# Patient Record
Sex: MALE | Race: OTHER | HISPANIC OR LATINO | URBAN - METROPOLITAN AREA
[De-identification: names, ages, dates, MRNs, and addresses within clinical notes are randomized per-mention and may not be internally consistent; named-entity substitution may affect disease eponyms.]

---

## 2017-11-03 VITALS
DIASTOLIC BLOOD PRESSURE: 79 MMHG | TEMPERATURE: 98 F | OXYGEN SATURATION: 100 % | SYSTOLIC BLOOD PRESSURE: 137 MMHG | WEIGHT: 199.96 LBS | HEART RATE: 123 BPM | RESPIRATION RATE: 20 BRPM

## 2017-11-03 LAB
ALBUMIN SERPL ELPH-MCNC: 4.4 G/DL — SIGNIFICANT CHANGE UP (ref 3.3–5)
ALP SERPL-CCNC: 68 U/L — SIGNIFICANT CHANGE UP (ref 40–120)
ALT FLD-CCNC: 44 U/L — SIGNIFICANT CHANGE UP (ref 10–45)
ANION GAP SERPL CALC-SCNC: 19 MMOL/L — HIGH (ref 5–17)
ANION GAP SERPL CALC-SCNC: 20 MMOL/L — HIGH (ref 5–17)
APPEARANCE UR: CLEAR — SIGNIFICANT CHANGE UP
APTT BLD: 31.4 SEC — SIGNIFICANT CHANGE UP (ref 27.5–37.4)
AST SERPL-CCNC: 51 U/L — HIGH (ref 10–40)
BASE EXCESS BLDV CALC-SCNC: -2.1 MMOL/L — SIGNIFICANT CHANGE UP
BASOPHILS NFR BLD AUTO: 0.7 % — SIGNIFICANT CHANGE UP (ref 0–2)
BILIRUB SERPL-MCNC: 0.3 MG/DL — SIGNIFICANT CHANGE UP (ref 0.2–1.2)
BILIRUB UR-MCNC: NEGATIVE — SIGNIFICANT CHANGE UP
BUN SERPL-MCNC: 4 MG/DL — LOW (ref 7–23)
BUN SERPL-MCNC: 5 MG/DL — LOW (ref 7–23)
CALCIUM SERPL-MCNC: 8.4 MG/DL — SIGNIFICANT CHANGE UP (ref 8.4–10.5)
CALCIUM SERPL-MCNC: 9.2 MG/DL — SIGNIFICANT CHANGE UP (ref 8.4–10.5)
CHLORIDE SERPL-SCNC: 104 MMOL/L — SIGNIFICANT CHANGE UP (ref 96–108)
CHLORIDE SERPL-SCNC: 95 MMOL/L — LOW (ref 96–108)
CO2 SERPL-SCNC: 17 MMOL/L — LOW (ref 22–31)
CO2 SERPL-SCNC: 21 MMOL/L — LOW (ref 22–31)
COLOR SPEC: YELLOW — SIGNIFICANT CHANGE UP
CREAT SERPL-MCNC: 0.91 MG/DL — SIGNIFICANT CHANGE UP (ref 0.5–1.3)
CREAT SERPL-MCNC: 0.97 MG/DL — SIGNIFICANT CHANGE UP (ref 0.5–1.3)
DIFF PNL FLD: NEGATIVE — SIGNIFICANT CHANGE UP
EOSINOPHIL NFR BLD AUTO: 0.3 % — SIGNIFICANT CHANGE UP (ref 0–6)
ETHANOL SERPL-MCNC: 432 MG/DL — HIGH (ref 0–10)
GAS PNL BLDV: SIGNIFICANT CHANGE UP
GLUCOSE SERPL-MCNC: 116 MG/DL — HIGH (ref 70–99)
GLUCOSE SERPL-MCNC: 126 MG/DL — HIGH (ref 70–99)
GLUCOSE UR QL: NEGATIVE — SIGNIFICANT CHANGE UP
HCO3 BLDV-SCNC: 22 MMOL/L — SIGNIFICANT CHANGE UP (ref 20–27)
HCT VFR BLD CALC: 39.4 % — SIGNIFICANT CHANGE UP (ref 39–50)
HGB BLD-MCNC: 14.2 G/DL — SIGNIFICANT CHANGE UP (ref 13–17)
INR BLD: 1.09 — SIGNIFICANT CHANGE UP (ref 0.88–1.16)
KETONES UR-MCNC: NEGATIVE — SIGNIFICANT CHANGE UP
LACTATE SERPL-SCNC: 2.5 MMOL/L — HIGH (ref 0.5–2)
LACTATE SERPL-SCNC: 3.9 MMOL/L — HIGH (ref 0.5–2)
LACTATE SERPL-SCNC: 4.1 MMOL/L — CRITICAL HIGH (ref 0.5–2)
LACTATE SERPL-SCNC: 4.2 MMOL/L — CRITICAL HIGH (ref 0.5–2)
LEUKOCYTE ESTERASE UR-ACNC: NEGATIVE — SIGNIFICANT CHANGE UP
LYMPHOCYTES # BLD AUTO: 22.7 % — SIGNIFICANT CHANGE UP (ref 13–44)
MCHC RBC-ENTMCNC: 30.7 PG — SIGNIFICANT CHANGE UP (ref 27–34)
MCHC RBC-ENTMCNC: 36 G/DL — SIGNIFICANT CHANGE UP (ref 32–36)
MCV RBC AUTO: 85.3 FL — SIGNIFICANT CHANGE UP (ref 80–100)
MONOCYTES NFR BLD AUTO: 9.2 % — SIGNIFICANT CHANGE UP (ref 2–14)
NEUTROPHILS NFR BLD AUTO: 67.1 % — SIGNIFICANT CHANGE UP (ref 43–77)
NITRITE UR-MCNC: NEGATIVE — SIGNIFICANT CHANGE UP
PCO2 BLDV: 33 MMHG — LOW (ref 41–51)
PCP SPEC-MCNC: SIGNIFICANT CHANGE UP
PH BLDV: 7.43 — SIGNIFICANT CHANGE UP (ref 7.32–7.43)
PH UR: 5.5 — SIGNIFICANT CHANGE UP (ref 5–8)
PLATELET # BLD AUTO: 356 K/UL — SIGNIFICANT CHANGE UP (ref 150–400)
PO2 BLDV: 78 MMHG — SIGNIFICANT CHANGE UP
POTASSIUM SERPL-MCNC: 3.9 MMOL/L — SIGNIFICANT CHANGE UP (ref 3.5–5.3)
POTASSIUM SERPL-MCNC: 4.1 MMOL/L — SIGNIFICANT CHANGE UP (ref 3.5–5.3)
POTASSIUM SERPL-SCNC: 3.9 MMOL/L — SIGNIFICANT CHANGE UP (ref 3.5–5.3)
POTASSIUM SERPL-SCNC: 4.1 MMOL/L — SIGNIFICANT CHANGE UP (ref 3.5–5.3)
PROT SERPL-MCNC: 7.8 G/DL — SIGNIFICANT CHANGE UP (ref 6–8.3)
PROT UR-MCNC: NEGATIVE MG/DL — SIGNIFICANT CHANGE UP
PROTHROM AB SERPL-ACNC: 12.1 SEC — SIGNIFICANT CHANGE UP (ref 9.8–12.7)
RBC # BLD: 4.62 M/UL — SIGNIFICANT CHANGE UP (ref 4.2–5.8)
RBC # FLD: 13.4 % — SIGNIFICANT CHANGE UP (ref 10.3–16.9)
SAO2 % BLDV: 95 % — SIGNIFICANT CHANGE UP
SODIUM SERPL-SCNC: 135 MMOL/L — SIGNIFICANT CHANGE UP (ref 135–145)
SODIUM SERPL-SCNC: 141 MMOL/L — SIGNIFICANT CHANGE UP (ref 135–145)
SP GR SPEC: <=1.005 — SIGNIFICANT CHANGE UP (ref 1–1.03)
UROBILINOGEN FLD QL: 0.2 E.U./DL — SIGNIFICANT CHANGE UP
WBC # BLD: 7.4 K/UL — SIGNIFICANT CHANGE UP (ref 3.8–10.5)
WBC # FLD AUTO: 7.4 K/UL — SIGNIFICANT CHANGE UP (ref 3.8–10.5)

## 2017-11-03 PROCEDURE — 99285 EMERGENCY DEPT VISIT HI MDM: CPT | Mod: 25

## 2017-11-03 PROCEDURE — 70450 CT HEAD/BRAIN W/O DYE: CPT | Mod: 26

## 2017-11-03 PROCEDURE — 93010 ELECTROCARDIOGRAM REPORT: CPT

## 2017-11-03 PROCEDURE — 71020: CPT | Mod: 26

## 2017-11-03 PROCEDURE — 90792 PSYCH DIAG EVAL W/MED SRVCS: CPT | Mod: GT

## 2017-11-03 RX ORDER — SODIUM CHLORIDE 9 MG/ML
2000 INJECTION INTRAMUSCULAR; INTRAVENOUS; SUBCUTANEOUS ONCE
Qty: 0 | Refills: 0 | Status: COMPLETED | OUTPATIENT
Start: 2017-11-03 | End: 2017-11-03

## 2017-11-03 RX ORDER — SODIUM CHLORIDE 9 MG/ML
1000 INJECTION, SOLUTION INTRAVENOUS
Qty: 0 | Refills: 0 | Status: DISCONTINUED | OUTPATIENT
Start: 2017-11-03 | End: 2017-11-04

## 2017-11-03 RX ORDER — SODIUM CHLORIDE 9 MG/ML
1000 INJECTION, SOLUTION INTRAVENOUS
Qty: 0 | Refills: 0 | Status: DISCONTINUED | OUTPATIENT
Start: 2017-11-03 | End: 2017-11-03

## 2017-11-03 RX ORDER — HALOPERIDOL DECANOATE 100 MG/ML
2.5 INJECTION INTRAMUSCULAR ONCE
Qty: 0 | Refills: 0 | Status: COMPLETED | OUTPATIENT
Start: 2017-11-03 | End: 2017-11-03

## 2017-11-03 RX ADMIN — SODIUM CHLORIDE 150 MILLILITER(S): 9 INJECTION, SOLUTION INTRAVENOUS at 20:30

## 2017-11-03 RX ADMIN — SODIUM CHLORIDE 250 MILLILITER(S): 9 INJECTION, SOLUTION INTRAVENOUS at 16:06

## 2017-11-03 RX ADMIN — SODIUM CHLORIDE 250 MILLILITER(S): 9 INJECTION, SOLUTION INTRAVENOUS at 16:51

## 2017-11-03 RX ADMIN — SODIUM CHLORIDE 250 MILLILITER(S): 9 INJECTION, SOLUTION INTRAVENOUS at 12:03

## 2017-11-03 RX ADMIN — SODIUM CHLORIDE 150 MILLILITER(S): 9 INJECTION, SOLUTION INTRAVENOUS at 23:40

## 2017-11-03 RX ADMIN — SODIUM CHLORIDE 2000 MILLILITER(S): 9 INJECTION INTRAMUSCULAR; INTRAVENOUS; SUBCUTANEOUS at 12:08

## 2017-11-03 RX ADMIN — Medication 50 MILLIGRAM(S): at 20:50

## 2017-11-03 RX ADMIN — SODIUM CHLORIDE 1000 MILLILITER(S): 9 INJECTION INTRAMUSCULAR; INTRAVENOUS; SUBCUTANEOUS at 23:41

## 2017-11-03 RX ADMIN — SODIUM CHLORIDE 150 MILLILITER(S): 9 INJECTION, SOLUTION INTRAVENOUS at 23:37

## 2017-11-03 RX ADMIN — SODIUM CHLORIDE 250 MILLILITER(S): 9 INJECTION, SOLUTION INTRAVENOUS at 21:22

## 2017-11-03 RX ADMIN — SODIUM CHLORIDE 250 MILLILITER(S): 9 INJECTION, SOLUTION INTRAVENOUS at 16:49

## 2017-11-03 RX ADMIN — SODIUM CHLORIDE 2000 MILLILITER(S): 9 INJECTION INTRAMUSCULAR; INTRAVENOUS; SUBCUTANEOUS at 16:51

## 2017-11-03 NOTE — CONSULT NOTE ADULT - SUBJECTIVE AND OBJECTIVE BOX
Patient is a 51 yo Sri Lankan speaking male pmh of depression, anxiety, not on any med, who was BIBEMS for alcohol intoxication at 11AM. Patient was interviewed around 10PM, stated he has alcohol problem in the past, quit drinking about a month ago, however gave in to craving today and had 5 small bottles of liquor. Stated Patient is a 53 yo Israeli speaking male pmh of depression, anxiety, not on any med, who was BIBEMS for alcohol intoxication at 11AM. Patient was interviewed around 10PM with , some difficulty in obtaining history,  stated he has alcohol problem in the past, quit drinking about a month ago, however gave in to craving today and had 5 small bottles of liquor. Stated he ate and drink yesterday, unclear how much. No other complaints, no recent fever/chill, no nausea/vomiting, no GI/ complaint. Denied any liver problem in the past, no vomiting blood, no leg swelling. Stated he was admitted last year to [facility] for alcohol problem, no intubation in the past. He is on government disability for his psychiatric problem and share an apartment with a roommate.  In the ED, he was found to have alcohol level of >400, given total of 4L bolus NS, banana bag started at 12PM, and D5 NS started at 8PM at 150cc/hour. He was also given librium 50mg at 8PM.    PMH: depression  PSH: none  FH: no DM, no HTN  SH: last drink this AM, no tob, no other drug  ALL: NKDA        V/S    T(F): 98.2 (11-03-17 @ 18:46), Max: 98.2 (11-03-17 @ 18:46)  HR: 111 (11-03-17 @ 18:46)  BP: 122/81 (11-03-17 @ 18:46)  RR: 18 (11-03-17 @ 18:46)  SpO2: 92% (11-03-17 @ 18:46)  Wt(kg): --  PE     GEN - Appears stated age. No distress. Slightly anxious           HEENT - NCAT, EOMI, PERRLA, MMM           CVS - RRR, no M/R/G, no JVD           PULM - CTA B/L, equal air entry, no increased work of breathing           ABD - Soft, nontender, nondistended, normal BS           EXT - Distal extremities warm, no cyanosis, no edema.           NEURO - AOx3, Moves all extremities.     LAB                        14.2   7.4   )-----------( 356      ( 03 Nov 2017 11:29 )             39.4               11-03    141  |  104  |  4<L>  ----------------------------<  116<H>  4.1   |  17<L>  |  0.91    Ca    8.4      03 Nov 2017 19:17    TPro  7.8  /  Alb  4.4  /  TBili  0.3  /  DBili  x   /  AST  51<H>  /  ALT  44  /  AlkPhos  68  11-03              PT/INR - ( 03 Nov 2017 11:29 )   PT: 12.1 sec;   INR: 1.09          PTT - ( 03 Nov 2017 11:29 )  PTT:31.4 sec            LIVER FUNCTIONS - ( 03 Nov 2017 11:29 )  Alb: 4.4 g/dL / Pro: 7.8 g/dL / ALK PHOS: 68 U/L / ALT: 44 U/L / AST: 51 U/L / GGT: x             Additional tests & radiology reviewed.

## 2017-11-03 NOTE — ED BEHAVIORAL HEALTH ASSESSMENT NOTE - DESCRIPTION (FIRST USE, LAST USE, QUANTITY, FREQUENCY, DURATION)
Reports history of alcohol abuse; reports that he was sober for 2 months before he relapsed this week.

## 2017-11-03 NOTE — ED BEHAVIORAL HEALTH ASSESSMENT NOTE - HPI (INCLUDE ILLNESS QUALITY, SEVERITY, DURATION, TIMING, CONTEXT, MODIFYING FACTORS, ASSOCIATED SIGNS AND SYMPTOMS)
Patient is a 52 year old,  male; domicile with; single; noncaregiver; unemployed on SSI; PPH of; no hospitalizations; no known suicide attempts; no known history of violence or arrests; no active substance abuse or known history of complicated withdrawal; PMH of; brought in by EMS; called by ; presenting with; in the setting of.  Patient presented with BAL of 432, has elevated anion gap and elevated lactate. Psychiatry was consulted for psychiatric clearance before going to ICU as patient appeared to have hallucinations. Patient is a 52 year old,  male; domiciled in section 8 housing with roommates; single; noncaregiver; unemployed on SSD (reports for hearing voices that wont go away); PPH of "hearing voices;" on Risperidal consta (last time was 2 weeks ago); unknown psych treatment, multiple hospitalizations (longest hospitalization was 6 months; last time was last year); one suicide attempt in response to commanding hallucinations in 1994; no known history of violence or arrests; reports significant alcohol dependence; PMH of; brought in by EMS; for altered mental status.  Patient presented with BAL of 432, has elevated anion gap and elevated lactate. Patient is being admitted to MICU given worsening of labs despite fluid resuscitation.  Psychiatry was consulted for psychiatric clearance before going to ICU as patient appeared to have hallucinations.    Patient reports he started drinking on Tuesday after being sober for about two months. He reports he had commanding auditory hallucinations last week telling him to drink and reports that is why he started drinking.  He reports he does not remember why. He reports that yesterday, he drank about 6 small bottles of tequila since 10am.  He reports he is unsure of how he ended up in the ER. He reports all he remembers is being in the ambulance coming to the hospital.      He denies any current auditory hallucinations.  He reports he last heard voices last week, which is when the voices told him to drink alcohol.  He reports the voices make his body hurt.    He denies any SI/HI with no intent or plan.  He reports he has not thought of hurting himself since 1994. He reports he is a 7th day Zoroastrian which really helps him a lot. He reports he goes to Spiritism every Saturdays.  He reports he is functioning per usual.    Patient does endorse some sx of depression including depressed mood at times (feels he has no one to talk to), increased feelings of guilt/worthlessness, poor energy, and poor concentration. He denies poor sleep (good with melatonin or benadryl), anhedonia, changes in appetite, psychomotor changes, and suicidal thoughts.  Patient denies SI/HI with no intent or plan. Patient endorses AH as noted above.  He reports he sees shadows.  He reports that he has some paranoid thoughts where if someone walks closely by him, he believes that they think he is a virgin.  Patient reports anxious thoughts but denies panic attacks.  Patient denies hx of clear brice. Patient is a 52 year old,  male; domiciled in section 8 housing with roommates; single; noncaregiver; unemployed on SSD (reports for hearing voices that wont go away); PPH of "hearing voices;" on Risperidal consta (last time was 2 weeks ago); unknown psych treatment, multiple hospitalizations (longest hospitalization was 6 months; last time was last year); one suicide attempt in response to commanding hallucinations in 1994; no known history of violence or arrests; reports significant alcohol dependence; PMH of; brought in by EMS; for altered mental status.  Patient presented with BAL of 432, has elevated anion gap and elevated lactate. Patient is being admitted to MICU given worsening of labs despite fluid resuscitation.  Psychiatry was consulted for psychiatric clearance before going to ICU as patient appeared to have hallucinations.    Patient reports he started drinking on Tuesday after being sober for about two months. He reports he had commanding auditory hallucinations last week telling him to drink and reports that is why he started drinking.  He reports he does not remember why. He reports that yesterday, he drank about 6 small bottles of tequila since 10am.  He reports he is unsure of how he ended up in the ER. He reports all he remembers is being in the ambulance coming to the hospital.  Patient states he wants to go a few times. Discussed importance of staying in the hospital for medical treatment. Patient is agreeable at this time, but does report he does not understand why he needs to be in the hospital. Discussed with him that medical team will speak to him about it.    He denies any current auditory hallucinations.  He reports he last heard voices last week, which is when the voices told him to drink alcohol.  He reports the voices make his body hurt.    He denies any SI/HI with no intent or plan.  He reports he has not thought of hurting himself since 1994. He reports he is a 7th day Hindu which really helps him a lot. He reports he goes to Gnostic[employer] every Saturdays.  He reports he is functioning per usual. He reports being compliant with meds.     Patient does endorse some sx of depression including depressed mood at times (feels he has no one to talk to), poor energy, and poor concentration. He denies poor sleep (good with melatonin or benadryl), anhedonia, increased feelings of guilt/worthlessness, changes in appetite, psychomotor changes, and suicidal thoughts.  Patient denies SI/HI with no intent or plan. Patient endorses AH as noted above.  He reports he sees shadows.  He reports that he has some paranoid thoughts where if someone walks closely by him, he believes that they think he is a virgin.  Patient reports anxious thoughts but denies panic attacks.  Patient denies hx of clear brice.

## 2017-11-03 NOTE — ED BEHAVIORAL HEALTH ASSESSMENT NOTE - DESCRIPTION
denies Patient lives in section 8 housing with 2 Citizen of [place] roommates. He reports he is very spiritual and he is a 7th day Taoist. Patient was calm and cooperative in the ED and did not exhibit any aggression. Patient did not require any PRN medications or any physical restraints. Patient was calm and cooperative in the ED and did not exhibit any aggression. Patient did not require any PRN medications or any physical restraints.  Patient did get Librium for alcohol withdrawal.

## 2017-11-03 NOTE — CONSULT NOTE ADULT - PROBLEM SELECTOR RECOMMENDATION 9
No sign of infection, no hypotensive, cr stable, patient urinated recently, no sign of hypoperfusion. Possibly 2/2 to type B lactic acidosis, could have underlying hepatic insufficiency vs thiamin deficiency.  S/p 4 L bolus NS, banana bag and D5 NS 150cc/hour for the past 4 hours.  Would give additional 2L bolus NS. Trend lactate  Anion gap looks like pure anion gap metabolic acidosis.  Dispo pending VBG. No sign of infection, no hypotensive, cr stable, patient urinated recently, no sign of hypoperfusion. Possibly 2/2 to type B lactic acidosis, could have underlying hepatic insufficiency vs thiamin deficiency.  S/p 4 L bolus NS, banana bag and D5 NS 150cc/hour for the past 4 hours.  Would give additional 2L bolus NS. Lactate already trended down to 2.5, would continue to trend  Anion gap looks like pure anion gap metabolic acidosis. VBG within normal litmit  Patient stable for RMF, discussed with Dr. Kitchen.

## 2017-11-03 NOTE — ED BEHAVIORAL HEALTH ASSESSMENT NOTE - SUMMARY
Patient is a 52 year old,  male; domiciled in section 8 housing with roommates; single; noncaregiver; unemployed on SSD (reports for hearing voices that wont go away); PPH of "hearing voices;" on Risperidal consta (last time was 2 weeks ago); unknown psych treatment, multiple hospitalizations (longest hospitalization was 6 months; last time was last year); one suicide attempt in response to commanding hallucinations in 1994; no known history of violence or arrests; reports significant alcohol dependence; PMH of; brought in by EMS; for altered mental status.  Patient presented with BAL of 432, has elevated anion gap and elevated lactate. Patient is being admitted to MICU given worsening of labs despite fluid resuscitation.  Psychiatry was consulted for psychiatric clearance before going to ICU as patient appeared to have hallucinations.    At this time, patient is psychiatrically cleared. He reports long history of hearing voices and is currently being treated for it with Risperidal consta by outpaitent psychiatrist. Patient reports he is at baseline. He reports he did hear voices last week telling him to drink. He denies any voices telling him to hurt himself since 1994. He reports that the last time he had suicidal thoughts was 1994. He does report persistent auditory hallucinations which are frustrating.  He denies any SI/HI with no intent or plan.  He reports he is very Zoroastrian and would not do that to himself.      Would recommend [facility] be involved to see if they could get in touch with psychiatrist and get collateral. Patient reports he has no family or friends.  Patient would benefit from outpatient dual diagnosis treatment for substance abuse and mental health. Patient is a 52 year old,  male; domiciled in section 8 housing with roommates; single; noncaregiver; unemployed on SSD (reports for hearing voices that wont go away); PPH of "hearing voices;" on Risperidal consta (last time was 2 weeks ago); unknown psych treatment, multiple hospitalizations (longest hospitalization was 6 months; last time was last year); one suicide attempt in response to commanding hallucinations in 1994; no known history of violence or arrests; reports significant alcohol dependence; PMH of; brought in by EMS; for altered mental status.  Patient presented with BAL of 432, has elevated anion gap and elevated lactate. Patient is being admitted to MICU given worsening of labs despite fluid resuscitation.  Psychiatry was consulted for psychiatric clearance before going to ICU as patient appeared to have hallucinations.    At this time, patient is psychiatrically cleared. He reports long history of hearing voices and is currently being treated for it with Risperidal consta by outpaitent psychiatrist. Patient reports he is at baseline. He reports he did hear voices last week telling him to drink. He denies any voices telling him to hurt himself since 1994. He reports that the last time he had suicidal thoughts was 1994. He does report persistent auditory hallucinations which are frustrating.  He denies any SI/HI with no intent or plan.  He reports he is very Sikhism and would not do that to himself.      Discussed with ER team to discuss with patient in details what is going on with him and why he requires continued inpatient treatment as patient reports he is not sure why.  Patient was agreeable to staying after speaking to the writer.  Patient should not be discharged AMA without psychiatric clearance.      Would recommend [facility] be involved to see if they could get in touch with psychiatrist and get collateral. Patient reports he has no family or friends.  Patient would benefit from outpatient dual diagnosis treatment for substance abuse and mental health.

## 2017-11-03 NOTE — ED BEHAVIORAL HEALTH ASSESSMENT NOTE - PSYCHIATRIC ISSUES AND PLAN (INCLUDE STANDING AND PRN MEDICATION)
Continue Melatonin 6mg at bedtime for insomnia. Patient is on Risperidal consta (unclear dose, but reports he got it 2 weeks ago).  Would recommend finding out his psychiatrist information and seeing if medications need optimizing based on collateral from psychiatrist.

## 2017-11-03 NOTE — ED PROVIDER NOTE - PROGRESS NOTE DETAILS
Pt signed out to Dr Chavarria pending sobering and repeat labs; LAVERN Velez will cont to follow. pt seemed confused ETOH 427 pt given fluids and labs repeated Pt w/ rising lactate and AG w/ worsening acidosis. Pt wants to leave. Pt is A&OX3 (although thinks it is [facility]), but also volunteers he is hearing voices, telling him to kill himself. Pt reports he has been hearing voices for years, but does not have psychiatric dx. Pt states she has seen psych in the past, but they have told him he is functional, and he does not take any medications. Pt denies SI / HI. Pt states he had not drank for approx 2 months, but starting drinking again last night. Pt denies drug use. Pt denies f/c, URI, GI, or  sx. Pt is not tremulous, no tongue fasciculations. Given no fever, elevated WBC, or infectious, doubt labs 2/2 sepsis. No drug use. No seizure activity. Glucose 126. Sx most likely 2/2 AKA. Despite adequate fluid resuscitation, worsening labs. D5 initiated. MICU consulted for admission. Pt placed on 1:1 and psych consulted regarding AH. AH do not be 2/2 DT's as pt is not tremulous, no fasciculations, and does not appear to be in w/d. Librium given for w/d prophylaxis only.

## 2017-11-03 NOTE — ED ADULT NURSE NOTE - OBJECTIVE STATEMENT
Patient presents to the ED via EMS, as per EMS patient was found on the street of [address], drinking and tighten a belt around his chest and bystanders called 911. Patient alert to self, able to state name, agitated but cooperative. Placed on the cardiac monitor. IV line placed, labs sent.

## 2017-11-03 NOTE — ED PROVIDER NOTE - MEDICAL DECISION MAKING DETAILS
63 yo male with h/o ETOH abuse, initially confused on the triage, with ETOH on the breath, w/o any notable injury. blood ETOH-439, elevated lactic acid 3.9   that did not improved after IV hydration , but went higher. Diabetic ketoacidosis suspected and ICU was called for consult.  Pt also evaluated by tele psychiatrist on  call, as he was c/o of hearing voices. No SI/HI, pt did not recommended to be admitted for further psychiatric treatment, he has out pt psychiatrist for further f/u and medications adjustment. After further hydration, pt improved , lactic acid repeated -2.5, VBG- normal. medical admission /regional bed as per ICU team.

## 2017-11-03 NOTE — CONSULT NOTE ADULT - PROBLEM SELECTOR RECOMMENDATION 2
Had 4 small liquor bottle this AM from 1 month of alcohol free. Admitted last year at [facility], possibly from [facility]?  CIWA 1 (slight anxiety), doubt active withdraw at this point.  Would continue to monitor.

## 2017-11-03 NOTE — ED BEHAVIORAL HEALTH ASSESSMENT NOTE - RISK ASSESSMENT
Historical factors: past attempt, age >45, male, past inpatient hospitalization, unemployed, feels lonely,   Clinical factors: auditory hallucinations intermittently (not at this time, but did hear voices last week)  Acute factors: alcohol use  Risk reducing factors: Future oriented thinking; Hopeful; Wants help; [facility] therapeutic alliance; Stable mood; [facility] (reports it is his hope, and he would not hurt himself because of this); no family hx of suicide, denies abuse hx, denies panic attacks, denies SI/HI with no intent or plan (reports no SI since 1994), denies violence hx, med compliant, no access to guns, no global insomnia

## 2017-11-03 NOTE — ED BEHAVIORAL HEALTH ASSESSMENT NOTE - OTHER
Yuli bravo did not assess/telepsych roommates (a Ivorian female)- section 8 housing poor support system recent relapse in alcohol use N/A

## 2017-11-03 NOTE — CONSULT NOTE ADULT - ASSESSMENT
53 yo Bulgarian speaking male pmh of depression, anxiety, not on any med, who was BIBEMS for alcohol intoxication, found to have lactic acidosis 3.9 - 4.1

## 2017-11-03 NOTE — ED PROVIDER NOTE - ATTENDING CONTRIBUTION TO CARE
63 yo male found sleeping and biba for ams.  Pt w empty alcohol bottles nearby.  Pt v altered and unable to give history on arrival, now states name is Ever Irwin and denies etoh this am, no comment about last pm, denies drugs, denies pain, injury.  No complaint.  Awake, intoxicated, nc/at, lung cta, heart reg, abd soft/nt, ext no c/c/e, no neck/back ttp, old ecchymosis ue, no other evidence of trauma, naidu, no gross sens deficits, motor 5/5, cn intact.  Pt arrived and unable to obtain hpi - labs, ct head, ivf to assess.  Initial labs w elevated lactate, etoh, ag; plan repeat after ivf; ct head neg.  Plan for dc if improved labs, awake and ambulatory w steady gait.

## 2017-11-03 NOTE — ED PROVIDER NOTE - OBJECTIVE STATEMENT
pt well known to ED pt visibly intox and no injury no NVD no cough no fever no chills no open wounds no other complaints no fever no dizzy no headache no chills no NVD no chest pain no SOB no shakes no aches no other  injury no other complaints no injury pt confused unable to give name at reg

## 2017-11-03 NOTE — CONSULT NOTE ADULT - ATTENDING COMMENTS
52M etoh depression, etoh intoxixation, lactic acidosis, volume depletion, with improvement after IVF crystalloiod. cooperative and hemodynamically adequate. no indication for tele/icu at this time.

## 2017-11-04 ENCOUNTER — INPATIENT (INPATIENT)
Facility: HOSPITAL | Age: 52
LOS: 0 days | Discharge: HOME | DRG: 897 | End: 2017-11-04
Attending: INTERNAL MEDICINE | Admitting: INTERNAL MEDICINE
Payer: COMMERCIAL

## 2017-11-04 VITALS
HEART RATE: 69 BPM | DIASTOLIC BLOOD PRESSURE: 74 MMHG | RESPIRATION RATE: 18 BRPM | OXYGEN SATURATION: 98 % | SYSTOLIC BLOOD PRESSURE: 119 MMHG | TEMPERATURE: 98 F

## 2017-11-04 DIAGNOSIS — F10.229 ALCOHOL DEPENDENCE WITH INTOXICATION, UNSPECIFIED: ICD-10-CM

## 2017-11-04 DIAGNOSIS — F32.9 MAJOR DEPRESSIVE DISORDER, SINGLE EPISODE, UNSPECIFIED: ICD-10-CM

## 2017-11-04 DIAGNOSIS — F10.10 ALCOHOL ABUSE, UNCOMPLICATED: ICD-10-CM

## 2017-11-04 DIAGNOSIS — R63.8 OTHER SYMPTOMS AND SIGNS CONCERNING FOOD AND FLUID INTAKE: ICD-10-CM

## 2017-11-04 DIAGNOSIS — E86.9 VOLUME DEPLETION, UNSPECIFIED: ICD-10-CM

## 2017-11-04 DIAGNOSIS — Z98.890 OTHER SPECIFIED POSTPROCEDURAL STATES: Chronic | ICD-10-CM

## 2017-11-04 DIAGNOSIS — Z29.9 ENCOUNTER FOR PROPHYLACTIC MEASURES, UNSPECIFIED: ICD-10-CM

## 2017-11-04 DIAGNOSIS — E87.2 ACIDOSIS: ICD-10-CM

## 2017-11-04 DIAGNOSIS — F20.9 SCHIZOPHRENIA, UNSPECIFIED: ICD-10-CM

## 2017-11-04 DIAGNOSIS — F10.99 ALCOHOL USE, UNSPECIFIED WITH UNSPECIFIED ALCOHOL-INDUCED DISORDER: ICD-10-CM

## 2017-11-04 DIAGNOSIS — Z78.9 OTHER SPECIFIED HEALTH STATUS: ICD-10-CM

## 2017-11-04 LAB
ANION GAP SERPL CALC-SCNC: 13 MMOL/L — SIGNIFICANT CHANGE UP (ref 5–17)
ANION GAP SERPL CALC-SCNC: 16 MMOL/L — SIGNIFICANT CHANGE UP (ref 5–17)
BUN SERPL-MCNC: 4 MG/DL — LOW (ref 7–23)
BUN SERPL-MCNC: 4 MG/DL — LOW (ref 7–23)
CALCIUM SERPL-MCNC: 7.9 MG/DL — LOW (ref 8.4–10.5)
CALCIUM SERPL-MCNC: 8.6 MG/DL — SIGNIFICANT CHANGE UP (ref 8.4–10.5)
CHLORIDE SERPL-SCNC: 105 MMOL/L — SIGNIFICANT CHANGE UP (ref 96–108)
CHLORIDE SERPL-SCNC: 106 MMOL/L — SIGNIFICANT CHANGE UP (ref 96–108)
CO2 SERPL-SCNC: 19 MMOL/L — LOW (ref 22–31)
CO2 SERPL-SCNC: 21 MMOL/L — LOW (ref 22–31)
CREAT SERPL-MCNC: 0.8 MG/DL — SIGNIFICANT CHANGE UP (ref 0.5–1.3)
CREAT SERPL-MCNC: 0.87 MG/DL — SIGNIFICANT CHANGE UP (ref 0.5–1.3)
GLUCOSE BLDC GLUCOMTR-MCNC: 139 MG/DL — HIGH (ref 70–99)
GLUCOSE BLDC GLUCOMTR-MCNC: 241 MG/DL — HIGH (ref 70–99)
GLUCOSE BLDC GLUCOMTR-MCNC: 58 MG/DL — LOW (ref 70–99)
GLUCOSE SERPL-MCNC: 103 MG/DL — HIGH (ref 70–99)
GLUCOSE SERPL-MCNC: 125 MG/DL — HIGH (ref 70–99)
LACTATE SERPL-SCNC: 1.2 MMOL/L — SIGNIFICANT CHANGE UP (ref 0.5–2)
LACTATE SERPL-SCNC: 2.8 MMOL/L — HIGH (ref 0.5–2)
MAGNESIUM SERPL-MCNC: 1.7 MG/DL — SIGNIFICANT CHANGE UP (ref 1.6–2.6)
PHOSPHATE SERPL-MCNC: 1.5 MG/DL — LOW (ref 2.5–4.5)
POTASSIUM SERPL-MCNC: 3.7 MMOL/L — SIGNIFICANT CHANGE UP (ref 3.5–5.3)
POTASSIUM SERPL-MCNC: 3.8 MMOL/L — SIGNIFICANT CHANGE UP (ref 3.5–5.3)
POTASSIUM SERPL-SCNC: 3.7 MMOL/L — SIGNIFICANT CHANGE UP (ref 3.5–5.3)
POTASSIUM SERPL-SCNC: 3.8 MMOL/L — SIGNIFICANT CHANGE UP (ref 3.5–5.3)
SODIUM SERPL-SCNC: 139 MMOL/L — SIGNIFICANT CHANGE UP (ref 135–145)
SODIUM SERPL-SCNC: 141 MMOL/L — SIGNIFICANT CHANGE UP (ref 135–145)

## 2017-11-04 PROCEDURE — 83605 ASSAY OF LACTIC ACID: CPT

## 2017-11-04 PROCEDURE — 99231 SBSQ HOSP IP/OBS SF/LOW 25: CPT

## 2017-11-04 PROCEDURE — 96376 TX/PRO/DX INJ SAME DRUG ADON: CPT

## 2017-11-04 PROCEDURE — 80053 COMPREHEN METABOLIC PANEL: CPT

## 2017-11-04 PROCEDURE — 81003 URINALYSIS AUTO W/O SCOPE: CPT

## 2017-11-04 PROCEDURE — 99285 EMERGENCY DEPT VISIT HI MDM: CPT | Mod: 25

## 2017-11-04 PROCEDURE — 84100 ASSAY OF PHOSPHORUS: CPT

## 2017-11-04 PROCEDURE — 93005 ELECTROCARDIOGRAM TRACING: CPT

## 2017-11-04 PROCEDURE — 85610 PROTHROMBIN TIME: CPT

## 2017-11-04 PROCEDURE — 80048 BASIC METABOLIC PNL TOTAL CA: CPT

## 2017-11-04 PROCEDURE — 90686 IIV4 VACC NO PRSV 0.5 ML IM: CPT

## 2017-11-04 PROCEDURE — 82962 GLUCOSE BLOOD TEST: CPT

## 2017-11-04 PROCEDURE — 80307 DRUG TEST PRSMV CHEM ANLYZR: CPT

## 2017-11-04 PROCEDURE — 96374 THER/PROPH/DIAG INJ IV PUSH: CPT

## 2017-11-04 PROCEDURE — 82803 BLOOD GASES ANY COMBINATION: CPT

## 2017-11-04 PROCEDURE — 85025 COMPLETE CBC W/AUTO DIFF WBC: CPT

## 2017-11-04 PROCEDURE — 99283 EMERGENCY DEPT VISIT LOW MDM: CPT | Mod: GC

## 2017-11-04 PROCEDURE — 99223 1ST HOSP IP/OBS HIGH 75: CPT | Mod: GC

## 2017-11-04 PROCEDURE — 70450 CT HEAD/BRAIN W/O DYE: CPT

## 2017-11-04 PROCEDURE — 85730 THROMBOPLASTIN TIME PARTIAL: CPT

## 2017-11-04 PROCEDURE — 71046 X-RAY EXAM CHEST 2 VIEWS: CPT

## 2017-11-04 PROCEDURE — 83735 ASSAY OF MAGNESIUM: CPT

## 2017-11-04 PROCEDURE — 36415 COLL VENOUS BLD VENIPUNCTURE: CPT

## 2017-11-04 RX ORDER — HEPARIN SODIUM 5000 [USP'U]/ML
5000 INJECTION INTRAVENOUS; SUBCUTANEOUS EVERY 8 HOURS
Qty: 0 | Refills: 0 | Status: DISCONTINUED | OUTPATIENT
Start: 2017-11-04 | End: 2017-11-04

## 2017-11-04 RX ORDER — FOLIC ACID 0.8 MG
1 TABLET ORAL DAILY
Qty: 0 | Refills: 0 | Status: DISCONTINUED | OUTPATIENT
Start: 2017-11-04 | End: 2017-11-04

## 2017-11-04 RX ORDER — DEXTROSE 50 % IN WATER 50 %
25 SYRINGE (ML) INTRAVENOUS ONCE
Qty: 0 | Refills: 0 | Status: COMPLETED | OUTPATIENT
Start: 2017-11-04 | End: 2017-11-04

## 2017-11-04 RX ORDER — INFLUENZA VIRUS VACCINE 15; 15; 15; 15 UG/.5ML; UG/.5ML; UG/.5ML; UG/.5ML
0.5 SUSPENSION INTRAMUSCULAR ONCE
Qty: 0 | Refills: 0 | Status: COMPLETED | OUTPATIENT
Start: 2017-11-04 | End: 2017-11-04

## 2017-11-04 RX ORDER — RISPERIDONE 4 MG/1
0 TABLET ORAL
Qty: 0 | Refills: 0 | COMMUNITY

## 2017-11-04 RX ORDER — THIAMINE MONONITRATE (VIT B1) 100 MG
100 TABLET ORAL DAILY
Qty: 0 | Refills: 0 | Status: DISCONTINUED | OUTPATIENT
Start: 2017-11-04 | End: 2017-11-04

## 2017-11-04 RX ADMIN — Medication 1 MILLIGRAM(S): at 11:14

## 2017-11-04 RX ADMIN — INFLUENZA VIRUS VACCINE 0.5 MILLILITER(S): 15; 15; 15; 15 SUSPENSION INTRAMUSCULAR at 06:25

## 2017-11-04 RX ADMIN — Medication 1 TABLET(S): at 11:14

## 2017-11-04 RX ADMIN — Medication 100 MILLIGRAM(S): at 11:14

## 2017-11-04 RX ADMIN — SODIUM CHLORIDE 150 MILLILITER(S): 9 INJECTION, SOLUTION INTRAVENOUS at 03:30

## 2017-11-04 RX ADMIN — Medication 25 GRAM(S): at 03:10

## 2017-11-04 RX ADMIN — SODIUM CHLORIDE 1000 MILLILITER(S): 9 INJECTION INTRAMUSCULAR; INTRAVENOUS; SUBCUTANEOUS at 00:50

## 2017-11-04 RX ADMIN — SODIUM CHLORIDE 1000 MILLILITER(S): 9 INJECTION INTRAMUSCULAR; INTRAVENOUS; SUBCUTANEOUS at 00:29

## 2017-11-04 RX ADMIN — HEPARIN SODIUM 5000 UNIT(S): 5000 INJECTION INTRAVENOUS; SUBCUTANEOUS at 06:17

## 2017-11-04 RX ADMIN — SODIUM CHLORIDE 250 MILLILITER(S): 9 INJECTION, SOLUTION INTRAVENOUS at 00:05

## 2017-11-04 NOTE — DISCHARGE NOTE ADULT - SECONDARY DIAGNOSIS.
Lactic acid acidosis Depression, unspecified depression type Schizophrenia, unspecified type Nutrition, metabolism, and development symptoms

## 2017-11-04 NOTE — PROGRESS NOTE ADULT - PROBLEM SELECTOR PLAN 4
well nourished, maggie repleted, dvt ppx    Dispo -> can likely be d/c home today/tomorrow pending psych eval

## 2017-11-04 NOTE — PROGRESS NOTE ADULT - SUBJECTIVE AND OBJECTIVE BOX
Patient is a 52y old  Male who presents with a chief complaint of etoh abuse (2017 03:15)      INTERVAL HPI/OVERNIGHT EVENTS:  no events overnight. pt feeling well.  no signs of etoh withdrawal.  awake, comfortable.      Review of Systems: 12 point review of systems otherwise negative  ( - )fevers/chills  ( - ) dyspnea  ( - ) cough  ( - ) chest pain  ( - ) palpatations  ( - ) dizziness/lightheadedness  ( - ) nausea/vomiting  ( - ) abd pain  ( - ) diarrhea  ( - ) melena  ( - ) hematochezia  ( - ) dysuria  ( - ) hematuria  ( - ) leg swelling  ( -) calf tenderness  ( - ) motor weakness  ( - ) extremity numbness  ( - ) back pain  ( + ) tolerating POs  ( + ) BM    MEDICATIONS  (STANDING):  folic acid 1 milliGRAM(s) Oral daily  heparin  Injectable 5000 Unit(s) SubCutaneous every 8 hours  multivitamin 1 Tablet(s) Oral daily  thiamine 100 milliGRAM(s) Oral daily    MEDICATIONS  (PRN):      Allergies    No Known Allergies    Intolerances          Vital Signs Last 24 Hrs  T(C): 36.8 (2017 10:57), Max: 37.1 (2017 05:53)  T(F): 98.3 (2017 10:57), Max: 98.7 (2017 05:53)  HR: 69 (2017 10:57) (69 - 120)  BP: 119/74 (2017 10:57) (94/63 - 129/81)  BP(mean): --  RR: 18 (2017 10:57) (16 - 20)  SpO2: 98% (2017 10:57) (92% - 98%)  CAPILLARY BLOOD GLUCOSE      POCT Blood Glucose.: 241 mg/dL (2017 09:54)  POCT Blood Glucose.: 139 mg/dL (2017 03:27)  POCT Blood Glucose.: 58 mg/dL (2017 02:58)       @ 07:01  -   @ 07:00  --------------------------------------------------------  IN: 600 mL / OUT: 0 mL / NET: 600 mL        Physical Exam:    Daily Height in cm: 157.48 (2017 02:53)    Daily Weight in k.2 (2017 02:53)  General:  Well appearing, NAD, not cachetic  HEENT:  Nonicteric, PERRLA  CV:  RRR, no murmur, no JVD  Lungs:  CTA B/L, no wheezes, rales, rhonchi  Abdomen:  Soft, non-tender, no distended, positive BS, no hepatosplenomegaly  Extremities:  2+ pulses, no c/c, no edema  Skin:  Warm and dry, no rashes  :  No nuñez  Neuro:  AAOx3, non-focal, CN II-XII grossly intact, CIWA score 0, no signs of etoh withdrawal  No Restraints    LABS:                        14.2   7.4   )-----------( 356      ( 2017 11:29 )             39.4     11-04    139  |  105  |  4<L>  ----------------------------<  103<H>  3.8   |  21<L>  |  0.80    Ca    8.6      2017 07:11  Phos  1.5     -  Mg     1.7         TPro  7.8  /  Alb  4.4  /  TBili  0.3  /  DBili  x   /  AST  51<H>  /  ALT  44  /  AlkPhos  68  11-03    PT/INR - ( 2017 11:29 )   PT: 12.1 sec;   INR: 1.09          PTT - ( 2017 11:29 )  PTT:31.4 sec  Urinalysis Basic - ( 2017 12:06 )    Color: Yellow / Appearance: Clear / SG: <=1.005 / pH: x  Gluc: x / Ketone: NEGATIVE  / Bili: Negative / Urobili: 0.2 E.U./dL   Blood: x / Protein: NEGATIVE mg/dL / Nitrite: NEGATIVE   Leuk Esterase: NEGATIVE / RBC: x / WBC x   Sq Epi: x / Non Sq Epi: x / Bacteria: x          RADIOLOGY & ADDITIONAL TESTS:    ---------------------------------------------------------------------------  I personally reviewed: [  ]EKG   [  ]CXR    [  ] CT    [  ]Other  ---------------------------------------------------------------------------  PLEASE CHECK WHEN PRESENT:     [  ]Heart Failure     [  ] Acute     [  ] Acute on Chronic     [  ] Chronic  -------------------------------------------------------------------     [  ]Diastolic [HFpEF]     [  ]Systolic [HFrEF]     [  ]Combined [HFpEF & HFrEF]     [  ]Other:  -------------------------------------------------------------------  [  ]HILARIO     [  ]ATN     [  ]Reneal Medullary Necrosis     [  ]Renal Cortical Necrosis     [  ]Other Pathological Lesions:    [  ]CKD 1  [  ]CKD 2  [  ]CKD 3  [  ]CKD 4  [  ]CKD 5  [  ]Other  -------------------------------------------------------------------  [  ]Other/Unspecified:    --------------------------------------------------------------------    Abdominal Nutritional Status  [  ]Malnutrition: See Nutrition Note  [  ]Cachexia  [  ]Other:   [  ]Supplement Ordered:  [  ]Morbid Obesity (BMI >=40]

## 2017-11-04 NOTE — PROGRESS NOTE BEHAVIORAL HEALTH - NSBHFUPINTERVALHXFT_PSY_A_CORE
53yo M with reported history of alcohol, reported history of psychotic and mood symptoms, reportedly in ACT team with risperdal consta who presented with alcohol intoxication and was admitted to medicine for management/observation for w/d. Patient was seen by telepsychiatrist and psych cleared but recommended final psych re-evaluation prior to discharge.      Patient was seen and evaluated with  # [id number]. Reported feeling "good" - continued to deny any self injurious or SI/intnet or plan. He denied any AVH or paranoia. He denied any HI. Patient reported that his mood was improved today. Patient reported that he has "a team that comes to his house and gives injection" and that the next one is supposed to be on Tuesday. He reported that his therapist/psychiatrist Dr Du - next appointment on Thursday.

## 2017-11-04 NOTE — PROGRESS NOTE BEHAVIORAL HEALTH - RISK ASSESSMENT
Low acute risk to self- protective factors including in treatment, no acute mood symptoms, no SI/intent or plan; chronic risk including substance use

## 2017-11-04 NOTE — H&P ADULT - PMH
Alcohol withdrawal syndrome without complication    Depression, unspecified depression type    Suicide attempt  2/2 auditory hallucinations in 1994

## 2017-11-04 NOTE — H&P ADULT - ATTENDING COMMENTS
pt seen and examined; reviewed vs, labs, cxr, EKG  PE findings as above , except pt w/ mild tongue fasciculations , no tremors, oriented x 3  1. lactic acidosis: on IVFs, monitor lactate, no signs of sepsis currently, monitor VS, labs  2. ETOH intoxication: monitor CIWA, Ativan PRN , encourage cessation   3. depression: monitor for worsening sxs, follow up psych recs

## 2017-11-04 NOTE — H&P ADULT - NSHPPHYSICALEXAM_GEN_ALL_CORE
General: comfortable, pleasant lying in bed NAD   HEENT: EOMI, PERRL, MMM no tongue fasciculations. Scar noted from previous craniotomy, R side parietal area, no other head trauma seen.    Neck: supple, no JVD or LAD   CVS: S1, S2 RRR no murmurs/rubs/gallops   Resp: b/l CTA throughout, no rhonchi/crackles   Extremities: contusions noted on both knees without swelling no ROM deficits. no edema, wwp, moving all extremities and 5/5 UE +LE b/l strength, skin dry   Neuro: AAOx3, no sensory/motor deficits, CN II-XII in tact    CIWA: 1 (mild headache)

## 2017-11-04 NOTE — H&P ADULT - PROBLEM SELECTOR PLAN 4
-regular diet   -D5NS at 150cc /hr --will continue for now as pt hypoglycemic on arrival to [facility] to 58, 1amp given as well as PO diet. Will monitor.   -replete lytes PRN -pt was sober for 2 months, stated he started drinking 2/2 hearing voices in his head which occurs from time to time and was evaluated by psych in the ER and stated pt would benefit from outpatient dual diagnosis treatment for substance abuse and mental health.  -also has hx of ETOH withdrawal in the past (no hx DT/intubation/seizures) with his sx normally consisting of tremor, anxiety and nausea. Last admission at [facility] 6mo ago.   -He is s/p librium 50mg x1 in the ER with no evidence of withdrawal currently CIWA 1 for mild headache. Pt did not exhibit any signs of withdrawal per signout or documentation in ER and will h/o on additional librium dose as pt has been sober for 2 months now with one binge episode now.   -Monitor clinically for now and perform CIWA with ativan 2mg PRN withdrawal, if requires then will continue librium taper at that time

## 2017-11-04 NOTE — DISCHARGE NOTE ADULT - MEDICATION SUMMARY - MEDICATIONS TO TAKE
I will START or STAY ON the medications listed below when I get home from the hospital:    RisperDAL  -- Indication: For Schizophrenia, unspecified type

## 2017-11-04 NOTE — H&P ADULT - ASSESSMENT
53yo Croatian Speaking M PMH Depression with hx of suicide attempt 2/2 auditory hallucinations in 1994, ETOH withdrawal, craniotomy 2/2 fall with head trauma in 2004 presents with ETOH intoxication to ER and subsequent lactic acidosis.

## 2017-11-04 NOTE — ED ADULT NURSE REASSESSMENT NOTE - NS ED NURSE REASSESS COMMENT FT1
pt awake responsive to stimuli ,speaks little english . Czech  provided.. Spoke with telepsyche. will follow up. Pt denies any  pain or discomfort. No attempts of getting out of bed. continue on  constant observation. No seizure and no tremors noted,

## 2017-11-04 NOTE — PROGRESS NOTE BEHAVIORAL HEALTH - PROBLEM SELECTOR PLAN 1
- no need for standing po psychotropics  -patient to receive consta on Tuesday  -advised to follow up with Dr Du on Thursday  -continue melatonin for sleep

## 2017-11-04 NOTE — PROGRESS NOTE ADULT - PROBLEM SELECTOR PLAN 1
presented intoxicated, elevated alcohol level  - AAO X 3 now, no signs of etoh withdrawal  - CIWA score 0  - would not treat for etoh withdrawal at this time  - hydrated, s/p IVF, electrolyte repletion  - cont thiamine and folate

## 2017-11-04 NOTE — DISCHARGE NOTE ADULT - PLAN OF CARE
To prevent alcohol use in the future Please refrain from using alcohol. It is dangerous to consume large amounts of alcohol and can lead to death. Please seek counseling if you feel as if you are abusing alcohol again. On admission, you were found to have lactic acidosis which occurred due alcohol intoxication. Please refrain from alcohol use in the future. You have a history of depression, please follow up with your outpatient psychiatrist on Thursdays to continue with management. You have a history of schizophrenia. Please continue to take your Risperdal and follow up with your outpatient psychiatrist on Thursdays. Please continue to eat healthy, take vitamins, and follow up with your doctors.

## 2017-11-04 NOTE — DISCHARGE NOTE ADULT - HOSPITAL COURSE
51yo Lithuanian Speaking M PMH Depression with hx of suicide attempt 2/2 auditory hallucinations in 1994, ETOH withdrawal, craniotomy 2/2 fall with head trauma in 2004 presents with ETOH intoxication to ER and subsequent lactic acidosis. In the ER VS Tm 98.2, HR , BP /63-81/63-81, RR 18-22, O2 sat % on RA. His initial ETOH level was 423, was to be monitored for intoxication but noted persistent lactic acidosis on labs as high as 4, most recent 2.5. He received librium 50mg x1 at 8pm, NS 2L x3, 1.5L banana bags, D5NS at 150cc/hr. CTH negative for acute changes. MICU was consulted for lactic acidosis and deemed stable for RMF. He was also evaluated by psych in the ER and was cleared for admission, not requiring 1:1 but stated to call psych for clearance if pt is trying to leave AMA. The first day of admission, patient remained stable with CIWA of 0. He was re-evaluated by psychiatry who cleared him for discharge with instructions to follow up with his outpatient psychiatrist Dr. Du. They d/w patient to continue getting Risperdal injections managed by Dr. Du. The patient is medically cleared and deemed stable for discharge.

## 2017-11-04 NOTE — H&P ADULT - NSHPSOCIALHISTORY_GEN_ALL_CORE
Denies past/present IVDA, never smoker. ETOH withdrawal hx noted above in HPI, had been sober for 2 months. Moved from [facility] in 1989. Lives in [address] with roommates.

## 2017-11-04 NOTE — ED BEHAVIORAL HEALTH NOTE - BEHAVIORAL HEALTH NOTE
Consult requested by LAVERN Velez at 23:30. Telepsychiatry attempted consult at 00:00, but unable to initiate because patient needed to be brought to private space with  device set up. ED staff educated to notify Telepsychiatry when patient and device are in room.

## 2017-11-04 NOTE — H&P ADULT - HISTORY OF PRESENT ILLNESS
51yo Turkmen Speaking M PMH Depression with hx of suicide attempt 2/2 auditory hallucinations in 1994, ETOH withdrawal, craniotomy 2/2 fall with head trauma in 2004 presents with ETOH intoxication to ER and subsequent lactic acidosis.       The patient has been sober for the last 2 months but drank yesterday after a voice in his head told him to do so. He is unsure of how much he drank, remembers 5 small bottles of vodka and maybe more. Does not remember how he got to the ER. Currently denies SI/HI or auditory hallucinations. States he hears voices from time to time and this has been chronic. He is on Risperdal injection once every 14-15 days, last dose was 2 weeks ago and due for one tomorrow. He is interested in changing his psychiatrist. He has had multiple ETOH admissions in his lifetime ~10, denies seizure/intubation/DTs usual admission is ~3 days. Last admission was in [facility], [place], operated by [facility] in Dec 2016. Normal feelings of withdrawal include headache, nervousness with tremor. Prior to admission and currently denies f/c, n/v, cough, rhinorrhea, sob, cp, dysuria, sick contacts. Has a mild headache right now but not agitated, denies anxiety.     In the ER VS Tm 98.2, HR , BP /63-81/63-81, RR 18-22, O2 sat % on RA. His initial ETOH level was 423, was to be monitored for intoxication but noted persistent lactic acidosis on labs as high as 4, most recent 2.5. He received librium 50mg x1 at 8pm, NS 2L x3, 1.5L banana bags, D5NS at 150cc/hr. CTH negative for acute changes. MICU was consulted for lactic acidosis and deemed stable for RMF. 51yo Pashto Speaking M PMH Depression with hx of suicide attempt 2/2 auditory hallucinations in 1994, ETOH withdrawal, craniotomy 2/2 fall with head trauma in 2004 presents with ETOH intoxication to ER and subsequent lactic acidosis.       The patient has been sober for the last 2 months but drank yesterday after a voice in his head told him to do so. He is unsure of how much he drank, remembers 5 small bottles of vodka and maybe more. Does not remember how he got to the ER. Currently denies SI/HI or hallucinations. States he hears voices from time to time and this has been chronic. He is on Risperdal injection once every 14-15 days, last dose was 2 weeks ago and due for one tomorrow. He is interested in changing his psychiatrist. He has had multiple ETOH admissions in his lifetime ~10, denies seizure/intubation/DTs usual admission is ~3 days. Last admission was in [facility], [place], operated by [facility] in Dec 2016. Normal feelings of withdrawal include headache, nervousness with tremor. Prior to admission and currently denies f/c, n/v, cough, rhinorrhea, sob, cp, dysuria, sick contacts. Has a mild headache right now but not agitated, denies anxiety.     In the ER VS Tm 98.2, HR , BP /63-81/63-81, RR 18-22, O2 sat % on RA. His initial ETOH level was 423, was to be monitored for intoxication but noted persistent lactic acidosis on labs as high as 4, most recent 2.5. He received librium 50mg x1 at 8pm, NS 2L x3, 1.5L banana bags, D5NS at 150cc/hr. CTH negative for acute changes. MICU was consulted for lactic acidosis and deemed stable for RMF. He was also evaluated by psych in the ER and was cleared for admission, not requiring 1:1 but stated to call psych for clearance if pt is trying to leave AMA.

## 2017-11-04 NOTE — H&P ADULT - PROBLEM SELECTOR PLAN 6
-regular diet   -D5NS at 150cc /hr --will continue for now as pt hypoglycemic on arrival to [facility] to 58, 1amp given as well as PO diet. Will monitor.   -replete lytes PRN

## 2017-11-04 NOTE — DISCHARGE NOTE ADULT - CARE PLAN
Principal Discharge DX:	Alcohol intoxication in relapsed alcoholic  Goal:	To prevent alcohol use in the future  Instructions for follow-up, activity and diet:	Please refrain from using alcohol. It is dangerous to consume large amounts of alcohol and can lead to death. Please seek counseling if you feel as if you are abusing alcohol again.  Secondary Diagnosis:	Lactic acid acidosis  Instructions for follow-up, activity and diet:	On admission, you were found to have lactic acidosis which occurred due alcohol intoxication. Please refrain from alcohol use in the future.  Secondary Diagnosis:	Depression, unspecified depression type  Instructions for follow-up, activity and diet:	You have a history of depression, please follow up with your outpatient psychiatrist on Thursdays to continue with management.  Secondary Diagnosis:	Schizophrenia, unspecified type  Secondary Diagnosis:	Nutrition, metabolism, and development symptoms Principal Discharge DX:	Alcohol intoxication in relapsed alcoholic  Goal:	To prevent alcohol use in the future  Instructions for follow-up, activity and diet:	Please refrain from using alcohol. It is dangerous to consume large amounts of alcohol and can lead to death. Please seek counseling if you feel as if you are abusing alcohol again.  Secondary Diagnosis:	Lactic acid acidosis  Instructions for follow-up, activity and diet:	On admission, you were found to have lactic acidosis which occurred due alcohol intoxication. Please refrain from alcohol use in the future.  Secondary Diagnosis:	Depression, unspecified depression type  Instructions for follow-up, activity and diet:	You have a history of depression, please follow up with your outpatient psychiatrist on Thursdays to continue with management.  Secondary Diagnosis:	Schizophrenia, unspecified type  Instructions for follow-up, activity and diet:	You have a history of schizophrenia. Please continue to take your Risperdal and follow up with your outpatient psychiatrist on Thursdays.  Secondary Diagnosis:	Nutrition, metabolism, and development symptoms  Instructions for follow-up, activity and diet:	Please continue to eat healthy, take vitamins, and follow up with your doctors.

## 2017-11-04 NOTE — PROGRESS NOTE ADULT - PROBLEM SELECTOR PLAN 2
- seen by psych yesterday due to long hx of mental illness and ? hallucinations, though in setting of etoh intoxication  - no hallucinations currently  - psych to see pt today for further recs  - will try to obtain collateral regarding psych meds

## 2017-11-04 NOTE — H&P ADULT - PROBLEM SELECTOR PLAN 5
-HSQ    Dispo: ELIEZER -hx depression with suicidal attempt in 1994, currently on risperdal contra injection q14-15 days at clinic on [address] (sees Dr. Du). Due for injection tomorrow.   -per ER psych note, pt would benefit from clinical psych inpatient for additional collateral on risperdal dosing and if medication optimization is needed based on current sx  -if pt attempts to leave AMA, contact psych for clearance for ER behavioral health note

## 2017-11-04 NOTE — H&P ADULT - PROBLEM SELECTOR PLAN 2
-pt was sober for 2 months, stated he started drinking 2/2 hearing voices in his head -pt was sober for 2 months, stated he started drinking 2/2 hearing voices in his head which occurs from time to time and was evaluated by psych in the ER and stated pt would benefit from outpatient dual diagnosis treatment for substance abuse and mental health.  -also has hx of ETOH withdrawal in the past (no hx DT/intubation/seizures) with his sx normally consisting of tremor, anxiety and nausea. Last admission at [facility] 6mo ago.   -He is s/p librium 50mg x1 in the ER with no evidence of withdrawal currently CIWA 1 for mild headache. Pt did not exhibit any signs of withdrawal per signout or documentation in ER and will h/o on additional librium dose as pt has been sober for 2 months now with one binge episode now.   -Monitor clinically for now and perform CIWA with ativan 2mg PRN withdrawal, if requires then will continue librium taper at that time see above

## 2017-11-04 NOTE — H&P ADULT - NSHPLABSRESULTS_GEN_ALL_CORE
14.2   7.4   )-----------( 356      ( 03 Nov 2017 11:29 )             39.4   11-04    141  |  106  |  4<L>  ----------------------------<  125<H>  3.7   |  19<L>  |  0.87    Ca    7.9<L>      04 Nov 2017 02:19  Phos  1.5     11-04  Mg     1.7     11-04    TPro  7.8  /  Alb  4.4  /  TBili  0.3  /  DBili  x   /  AST  51<H>  /  ALT  44  /  AlkPhos  68  11-03    Lactic acidosis 3.9--> 4.1--> 4.2 --> 2.5

## 2017-11-04 NOTE — H&P ADULT - PROBLEM SELECTOR PLAN 3
-hx depression with suicidal attempt in 1994, currently on risperdal contra injection q14-15 days at clinic on [address] (sees Dr. Du). Due for injection tomorrow.   -per ER psych note, pt would benefit from clinical psych inpatient for additional collateral on risperdal dosing and if medication optimization is needed based on current sx  -if pt attempts to leave AMA, contact psych for clearance for ER behavioral health note resolved

## 2017-11-04 NOTE — PROGRESS NOTE BEHAVIORAL HEALTH - SUMMARY
Patient with history of psychotic and mood symptoms with alcohol use d/o, in ACT team, in treatment with risperdal consta. Patient remains euthymic with linear thought process without SI/intent or plan. Patient remains w/o acute s/sx of psychosis, brice or psychosis. There remains no acute indication of an acute psychiatric risk of harm to self or others to warrant further psych intervention. Patient advised to continue with outpatient provider and regarding refraining from alcohol use. Patient advised to speak with outpatient provider regarding his reported mood issues.

## 2017-11-04 NOTE — DISCHARGE NOTE ADULT - PATIENT PORTAL LINK FT
“You can access the [facility] Patient Portal, offered by [facility], by registering with the following website: [email or web address]”

## 2017-11-07 DIAGNOSIS — F20.9 SCHIZOPHRENIA, UNSPECIFIED: ICD-10-CM
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
## 2017-11-07 DIAGNOSIS — F10.10 ALCOHOL ABUSE, UNCOMPLICATED: ICD-10-CM

## 2017-11-07 DIAGNOSIS — E87.2 ACIDOSIS: ICD-10-CM

## 2017-11-07 DIAGNOSIS — F32.89 OTHER SPECIFIED DEPRESSIVE EPISODES: ICD-10-CM

## 2017-11-07 DIAGNOSIS — F10.229 ALCOHOL DEPENDENCE WITH INTOXICATION, UNSPECIFIED: ICD-10-CM

## 2017-11-07 DIAGNOSIS — E86.9 VOLUME DEPLETION, UNSPECIFIED: ICD-10-CM

## 2023-02-06 NOTE — H&P ADULT - CLICK TO LAUNCH ORM
???? ??????? ????? ??????? ??????  ??????? ????? - ??? ????, ??????? ???????? ??????????? ??????????? ??? ?????????. ?? ?????? ??????? ????? ??????? ??????? ?? ?????? ?? ?????? ??????? ? ?????? ????????? ?????. ?? ?? ?????? ?????????????? ??????. ??????, ??? ?????????? ??? ????? ??????? ??????. ???????, ????? ????????? ????? ?????? ?????????????? ????. ????? ??????? ? ?????? ?????????, ???????, ?? ?????? ??????, ????? ???? ????? ??????? ??? ???.   ??????? ?????, ??????? ?? ?? ?????? ??????????????  ?? ?? ?????? ???????? ????? ?? ????????????? ???? ??????. ???????? ??, ??????? ????????? ? ???. ??? ?????? ?? ????????, ??? ???? ??? ????. ??????????????? ?? ???, ??? ?? ?????? ????????.   ???????? ???????  ___ ? ??? ???? ???? ??? ???? ?????? 55 ??? ??? ???? ??? ?????? ?????? 65 ???, ? ??????? ???? ??????? ??????.   ???  ___ ?? ???????.  ???????  ___ ??? 65 ??? ??? ??????.  ???? ??? ?????????? ??????????????  ___ ?? ??????????????, ????? ??-??????????????????? ?????????????, ??????????, ??????, ???????? ?????? ??? ?????????? ?????????? ?????????????.  ??????? ?????, ??????? ?? ?????? ??????????????  ?????????? ????????? ???????? ????? ??????? ??????, ??????? ?? ?????? ??????????????. ???????, ?????? ??? ??????? ?????. ? ????? ???????, ??? ??????? ????. ???????? ??, ??????? ????????? ? ???.   ???????  ___ ?? ?????? ???????? ??? ??????, ??????????? ??????????? ????????, ????? ??? ??????? ?????? ?? ????????? ????????????? ?????????? ????????   ??? ??????? ???????????.  ___ ??? ????????, ??? ??? ??????? ??????????? ??? ????????????? ?????? ??? ????????? ??? ??????? «????????» ??????????? (????) ??????? ??? ??????? «???????» ??????????? (????) ????????   ???? ???????? ????????  ___ ??? ????????, ??? ???? ???????? ???????? ????, ??? ?????? ?????  ??????? ?????? ? ?????  ___ ??? ????????, ??? ??? ??????? ?????? ? ????? ????, ??? ?????? ????? ??? A1C 6,5% ??? ?????   ????????? ?? ???????  ___ ?? ????????? ??????? ????? ???????,  ??????????? ?? ?????? ? ????? ?? ?????? ????????? ??? ?????????? ?????????? ??? ?????-???? ?????????? ?????????   ??? ?? ?????  ___ ?? ????? ????? ???????, ??????? ??? ?????? ????? ?? ????? ? ???????? ??????? ????? ?? ????? ????? ???????????? ? ?????? ??????? ????????? ?? ????? ????? ????-??? ??? ??????? ??? ?? ????? ?? ????? ???? ??? ?? ????? ??????? ? ???????   ??? ???  ___ ??? ???? ???????, ??? ? ??? ?????????? ??? ??? ????????? ??? ?????? ????? ?????????? 90 ?? ??? ??????, ???? ?? ???????, ??? 100 ?? ??? ??????, ???? ?? ????????   ???????????? ????????  ___ ?? ????? ????? 1 ????? ? ????, ???? ?? ???????, ??? ????? 2 ????? ? ????, ???? ?? ????????   ??? ??????? ???????  ___ ?? ????? ?????????? ????????????, ??????????? ? ??????????? ?? ??????????, ??? ? ??? ??? ????????? ? ??????   © [id number] [employer]. All rights reserved. This information is not intended as a substitute for professional medical care. Always follow your healthcare professional's instructions.         .

## 2023-07-10 NOTE — H&P ADULT - PROBLEM SELECTOR PROBLEM 1
Was A Bandage Applied: Yes Lactic acidosis Ketoconazole Counseling:   Patient counseled regarding improving absorption with orange juice.  Adverse effects include but are not limited to breast enlargement, headache, diarrhea, nausea, upset stomach, liver function test abnormalities, taste disturbance, and stomach pain.  There is a rare possibility of liver failure that can occur when taking ketoconazole. The patient understands that monitoring of LFTs may be required, especially at baseline. The patient verbalized understanding of the proper use and possible adverse effects of ketoconazole.  All of the patient's questions and concerns were addressed.

## 2024-08-07 NOTE — H&P ADULT - I WAS PHYSICALLY PRESENT FOR THE KEY PORTIONS OF THE EVALUATION AND MANAGEMENT (E/M) SERVICE PROVIDED.  I AGREE WITH THE ABOVE HISTORY, PHYSICAL, AND PLAN WHICH I HAVE REVIEWED AND EDITED WHERE APPROPRIATE
Detail Level: Detailed
Quality 226: Preventive Care And Screening: Tobacco Use: Screening And Cessation Intervention: Patient screened for tobacco use and is an ex/non-smoker
Statement Selected